# Patient Record
Sex: MALE | Race: WHITE | NOT HISPANIC OR LATINO | Employment: FULL TIME | URBAN - METROPOLITAN AREA
[De-identification: names, ages, dates, MRNs, and addresses within clinical notes are randomized per-mention and may not be internally consistent; named-entity substitution may affect disease eponyms.]

---

## 2021-12-10 ENCOUNTER — OFFICE VISIT (OUTPATIENT)
Dept: UROLOGY | Facility: CLINIC | Age: 46
End: 2021-12-10
Payer: COMMERCIAL

## 2021-12-10 VITALS — HEART RATE: 67 BPM | SYSTOLIC BLOOD PRESSURE: 129 MMHG | DIASTOLIC BLOOD PRESSURE: 67 MMHG

## 2021-12-10 DIAGNOSIS — N48.6 PEYRONIE'S DISEASE: Primary | ICD-10-CM

## 2021-12-10 PROCEDURE — 99203 PR OFFICE/OUTPT VISIT, NEW, LEVL III, 30-44 MIN: ICD-10-PCS | Mod: S$GLB,,, | Performed by: NURSE PRACTITIONER

## 2021-12-10 PROCEDURE — 99203 OFFICE O/P NEW LOW 30 MIN: CPT | Mod: S$GLB,,, | Performed by: NURSE PRACTITIONER

## 2022-01-03 ENCOUNTER — OFFICE VISIT (OUTPATIENT)
Dept: UROLOGY | Facility: CLINIC | Age: 47
End: 2022-01-03
Payer: COMMERCIAL

## 2022-01-03 VITALS
WEIGHT: 159.38 LBS | HEIGHT: 66 IN | DIASTOLIC BLOOD PRESSURE: 71 MMHG | HEART RATE: 67 BPM | BODY MASS INDEX: 25.61 KG/M2 | SYSTOLIC BLOOD PRESSURE: 106 MMHG

## 2022-01-03 DIAGNOSIS — N48.6 PEYRONIE'S DISEASE: ICD-10-CM

## 2022-01-03 PROCEDURE — 99244 PR OFFICE CONSULTATION,LEVEL IV: ICD-10-PCS | Mod: S$GLB,,, | Performed by: UROLOGY

## 2022-01-03 PROCEDURE — 1160F RVW MEDS BY RX/DR IN RCRD: CPT | Mod: CPTII,S$GLB,, | Performed by: UROLOGY

## 2022-01-03 PROCEDURE — 3008F PR BODY MASS INDEX (BMI) DOCUMENTED: ICD-10-PCS | Mod: CPTII,S$GLB,, | Performed by: UROLOGY

## 2022-01-03 PROCEDURE — 99999 PR PBB SHADOW E&M-EST. PATIENT-LVL III: ICD-10-PCS | Mod: PBBFAC,,, | Performed by: UROLOGY

## 2022-01-03 PROCEDURE — 1160F PR REVIEW ALL MEDS BY PRESCRIBER/CLIN PHARMACIST DOCUMENTED: ICD-10-PCS | Mod: CPTII,S$GLB,, | Performed by: UROLOGY

## 2022-01-03 PROCEDURE — 3074F SYST BP LT 130 MM HG: CPT | Mod: CPTII,S$GLB,, | Performed by: UROLOGY

## 2022-01-03 PROCEDURE — 3078F PR MOST RECENT DIASTOLIC BLOOD PRESSURE < 80 MM HG: ICD-10-PCS | Mod: CPTII,S$GLB,, | Performed by: UROLOGY

## 2022-01-03 PROCEDURE — 99244 OFF/OP CNSLTJ NEW/EST MOD 40: CPT | Mod: S$GLB,,, | Performed by: UROLOGY

## 2022-01-03 PROCEDURE — 3074F PR MOST RECENT SYSTOLIC BLOOD PRESSURE < 130 MM HG: ICD-10-PCS | Mod: CPTII,S$GLB,, | Performed by: UROLOGY

## 2022-01-03 PROCEDURE — 3078F DIAST BP <80 MM HG: CPT | Mod: CPTII,S$GLB,, | Performed by: UROLOGY

## 2022-01-03 PROCEDURE — 1159F MED LIST DOCD IN RCRD: CPT | Mod: CPTII,S$GLB,, | Performed by: UROLOGY

## 2022-01-03 PROCEDURE — 3008F BODY MASS INDEX DOCD: CPT | Mod: CPTII,S$GLB,, | Performed by: UROLOGY

## 2022-01-03 PROCEDURE — 99999 PR PBB SHADOW E&M-EST. PATIENT-LVL III: CPT | Mod: PBBFAC,,, | Performed by: UROLOGY

## 2022-01-03 PROCEDURE — 1159F PR MEDICATION LIST DOCUMENTED IN MEDICAL RECORD: ICD-10-PCS | Mod: CPTII,S$GLB,, | Performed by: UROLOGY

## 2022-01-03 NOTE — PROGRESS NOTES
CHIEF COMPLAINT:    Mr. Jason is a 46 y.o. male presenting for a consultation at the request of Sonja Briggs. Patient presents with peyronie's.    PRESENTING ILLNESS:    Chilango Jason is a 46 y.o. male who c/o peyronie's disease.  Has been present for > 1 year.  He has ~ 45 degrees of curve dorsally.  It's difficult to penetrate due to the curve.  Sex is not painful for him or his partner.    He denies ED.    REVIEW OF SYSTEMS:    Chilango Jason denies headache, blurred vision, fever, nausea, vomiting, chills, abdominal pain, chest pain, sore throat, bleeding per rectum, cough, SOB, recent loss of consciousness, recent mental status changes, seizures, dizziness, or upper or lower extremity weakness.    FABY  1. 5  2. 4  3. 5  4. 5  5. 5      PATIENT HISTORY:    Past Medical History:   Diagnosis Date    Anxiety     Frozen shoulder        History reviewed. No pertinent surgical history.    Family History   Problem Relation Age of Onset    Hypertension Father     Cancer Father     Hypertension Mother     Cancer Mother     Heart disease Mother     Cancer Paternal Grandmother     Heart disease Paternal Grandfather        Social History     Socioeconomic History    Marital status:    Tobacco Use    Smoking status: Current Some Day Smoker     Types: Cigarettes    Smokeless tobacco: Never Used    Tobacco comment: on occasion   Substance and Sexual Activity    Alcohol use: Yes     Comment: frequently 4-5 days a week    Drug use: Never    Sexual activity: Yes     Partners: Female     Birth control/protection: None, Partner-Vasectomy       Allergies:  Patient has no known allergies.    Medications:  No current outpatient medications on file.    PHYSICAL EXAMINATION:    The patient generally appears in good health, is appropriately interactive, and is in no apparent distress.     Eyes: anicteric sclerae, moist conjunctivae; no lid-lag; PERRLA     HENT: Atraumatic; oropharynx clear with moist  mucous membranes and no mucosal ulcerations;normal hard and soft palate.  No evidence of lymphadenopathy.    Neck: Trachea midline.  No thyromegaly.    Skin: No lesions.    Mental: Cooperative with normal affect.  Is oriented to time, place, and person.    Neuro: Grossly intact.    Chest: Normal inspiratory effort.   No accessory muscles.  No audible wheezes.  Respirations symmetric on inspiration and expiration.    Heart: Regular rhythm.      Abdomen:  Soft, non-tender. No masses or organomegaly. Bladder is not palpable. No evidence of flank discomfort. No evidence of inguinal hernia.    Genitourinary: The penis is circumcised with a plaque c/w peyronie's on the shaft. The urethral meatus is normal. The testes, epididymides, and cord structures are normal in size and contour bilaterally. The scrotum is normal in size and contour.    Extremities: No clubbing, cyanosis, or edema      LABS:      No results found for: PSA, PSADIAG, PSATOTAL, PSAFREE, PSAFREEPCT    IMPRESSION:    Encounter Diagnoses   Name Primary?    Peyronie's disease          PLAN:    1. Discussed the pathophysiology of peyronie's disease.  Recommend doppler u/s. I  have explained the risk, benefits, and alternatives of the procedure in detail. The patient voices understanding and all questions have been answered. The patient agrees to proceed as planned.      Copy to: [place].

## 2022-01-03 NOTE — LETTER
January 3, 2022        Sonja Briggs, GUZMAN  [address] [facility] [address]             [facility] [address]  [address]  [place] [address]  Phone: [phone]   Patient: Chilango Jason   MR Number: [id number]   YOB: 1975   Date of Visit: 1/3/2022       Dear Dr. Briggs:    Thank you for referring Chilango Jason to me for evaluation. Attached you will find relevant portions of my assessment and plan of care.    If you have questions, please do not hesitate to call me. I look forward to following Chilango Jason along with you.    Sincerely,      Robert Quiñones MD            CC  No Recipients    Enclosure

## 2022-01-13 ENCOUNTER — PATIENT MESSAGE (OUTPATIENT)
Dept: UROLOGY | Facility: CLINIC | Age: 47
End: 2022-01-13
Payer: COMMERCIAL

## 2022-02-17 ENCOUNTER — PROCEDURE VISIT (OUTPATIENT)
Dept: UROLOGY | Facility: CLINIC | Age: 47
End: 2022-02-17
Payer: COMMERCIAL

## 2022-02-17 VITALS
SYSTOLIC BLOOD PRESSURE: 137 MMHG | TEMPERATURE: 98 F | DIASTOLIC BLOOD PRESSURE: 90 MMHG | HEIGHT: 66 IN | BODY MASS INDEX: 25.57 KG/M2 | HEART RATE: 66 BPM | RESPIRATION RATE: 16 BRPM | WEIGHT: 159.13 LBS

## 2022-02-17 DIAGNOSIS — N48.6 PEYRONIE'S DISEASE: ICD-10-CM

## 2022-02-17 PROCEDURE — 93981 PENILE VASCULAR STUDY: CPT | Mod: 26,59,S$GLB, | Performed by: UROLOGY

## 2022-02-17 PROCEDURE — 54235 NJX CORPORA CAVERNOSA RX AGT: CPT | Mod: S$GLB,,, | Performed by: UROLOGY

## 2022-02-17 PROCEDURE — 96372 THER/PROPH/DIAG INJ SC/IM: CPT | Mod: 59,S$GLB,, | Performed by: UROLOGY

## 2022-02-17 PROCEDURE — 93980 PR PENILE VASCULAR STUDY,COMPLETE: ICD-10-PCS | Mod: 26,S$GLB,, | Performed by: UROLOGY

## 2022-02-17 PROCEDURE — 93980 PENILE VASCULAR STUDY: CPT | Mod: 26,S$GLB,, | Performed by: UROLOGY

## 2022-02-17 PROCEDURE — 54235 PR INJECT CORPORA CAVERN,PHARM AGNT: ICD-10-PCS | Mod: S$GLB,,, | Performed by: UROLOGY

## 2022-02-17 PROCEDURE — 96372 PR INJECTION,THERAP/PROPH/DIAG2ST, IM OR SUBCUT: ICD-10-PCS | Mod: 59,S$GLB,, | Performed by: UROLOGY

## 2022-02-17 PROCEDURE — 93981 PR PENILE VASCULAR STUDY,LTD OR F/U: ICD-10-PCS | Mod: 26,59,S$GLB, | Performed by: UROLOGY

## 2022-02-17 RX ORDER — PAPAVERINE HYDROCHLORIDE 30 MG/ML
60 INJECTION INTRAMUSCULAR; INTRAVENOUS
Status: COMPLETED | OUTPATIENT
Start: 2022-02-17 | End: 2022-02-17

## 2022-02-17 RX ADMIN — PAPAVERINE HYDROCHLORIDE 60 MG: 30 INJECTION INTRAMUSCULAR; INTRAVENOUS at 01:02

## 2022-02-17 NOTE — PROCEDURES
Procedures Date: 02/17/2022     Pre procedure diagnosis: Peyronie's disease    Post procedure diagnosis:same    Surgeon: Ishmael     Assistant: None     Procedure performed: 1. Doppler ultrasound of the penis (complete and follow up)          2. Intracavernosal injection of pharmacologic agent   Blood loss: None     Specimen: None     Procedure in detail:   The risks, benefits, complications, treatment options, and expected outcomes were discussed with the patient. The patient concurred with the proposed plan, giving informed consent.     In the flaccid state the patient's cavernosal artery was found with the doppler ultrasound. This was first done on the R and then on the L. His peak systolic velocity was 9.15 cm/s on the R and 8.66 cm/s  on the L.  End diastolic velocity was 2.47 cm/s on the R and 1.86 cm on the L.   The resistive index was 0.73 on the R and 0.79 on the L.    Using standard sterile technique, the patient was then injected with 60 mg of papaverine. A full erection was achieved.  He had approximately 40 degree curvature dorsally in the distal shaft.    The cavernosal arteries were then measured again with the doppler ultrasound. His peak systolic velocity was 28.9  cm/s on the R and 17.4 cm/s on the L. End diastolic velocity was 5.20 cm/s on the R and 2.72 cm/s on the L.   The resistive index was 0.82 on the R and 0.84 on the L.    He tolerated the procedure well without complication. He will be observed to ensure detumescence. He will be discharged home in stable condition and follow up to discuss his treatment options in the next 1-2 weeks.

## 2022-02-17 NOTE — PATIENT INSTRUCTIONS
After your Doppler US Procedure/PEP injection:    You may resume your usual diet, medications and activities following the procedure. Some side effects may occur that usually do not need medical attention. These side effects may go away during treatment as your body adjusts to the medicine. Contact your [facility]ist at [phone] if any of the following side effects continue or become extremely bothersome or if you have any questions.    Bruising or bleeding at place of injection   Burning (mild) along penis   Swelling at place of injection    You may also reach a urology resident on call after regular clinic hours and on weekends at [phone]. Papaverine injected into the penis may cause tingling at the tip of the penis. This is no cause for concern.